# Patient Record
Sex: FEMALE | Race: WHITE | NOT HISPANIC OR LATINO | Employment: FULL TIME | URBAN - METROPOLITAN AREA
[De-identification: names, ages, dates, MRNs, and addresses within clinical notes are randomized per-mention and may not be internally consistent; named-entity substitution may affect disease eponyms.]

---

## 2017-07-02 ENCOUNTER — OFFICE VISIT (OUTPATIENT)
Dept: URGENT CARE | Facility: URGENT CARE | Age: 47
End: 2017-07-02
Payer: COMMERCIAL

## 2017-07-02 ENCOUNTER — RADIANT APPOINTMENT (OUTPATIENT)
Dept: GENERAL RADIOLOGY | Facility: CLINIC | Age: 47
End: 2017-07-02
Attending: PHYSICIAN ASSISTANT
Payer: COMMERCIAL

## 2017-07-02 VITALS
WEIGHT: 165 LBS | RESPIRATION RATE: 14 BRPM | SYSTOLIC BLOOD PRESSURE: 130 MMHG | TEMPERATURE: 97 F | OXYGEN SATURATION: 98 % | HEIGHT: 65 IN | DIASTOLIC BLOOD PRESSURE: 76 MMHG | BODY MASS INDEX: 27.49 KG/M2 | HEART RATE: 89 BPM

## 2017-07-02 DIAGNOSIS — M79.671 RIGHT FOOT PAIN: Primary | ICD-10-CM

## 2017-07-02 DIAGNOSIS — M79.671 RIGHT FOOT PAIN: ICD-10-CM

## 2017-07-02 DIAGNOSIS — S93.601A FOOT SPRAIN, RIGHT, INITIAL ENCOUNTER: ICD-10-CM

## 2017-07-02 PROCEDURE — 99203 OFFICE O/P NEW LOW 30 MIN: CPT | Performed by: PHYSICIAN ASSISTANT

## 2017-07-02 PROCEDURE — 73630 X-RAY EXAM OF FOOT: CPT | Mod: RT

## 2017-07-02 NOTE — MR AVS SNAPSHOT
After Visit Summary   7/2/2017    Charlotte Garay    MRN: [id number]           Patient Information     Date Of Birth          1970        Visit Information        Provider Department      7/2/2017 5:10 PM Fatimah Harden PA-C [facility]        Today's Diagnoses     Right foot pain    -  1      Care Instructions      Foot Sprain    A sprain is a stretching or tearing of the ligaments that hold a joint together. There are no broken bones. Sprains generally take from 3-6 weeks to heal. A sprain may be treated with a splint, walking cast, or special boot. Mild sprains may not need any additional support.  Home care  The following guidelines will help you care for your injury at home:    Keep your leg elevated when sitting or lying down. This is very important during the first 48 hours to reduce swelling. Stay off the injured foot as much as possible until you can walk on it without pain. If needed, you may use crutches during the first week for this purpose. Crutches can be rented at many pharmacies or surgical/orthopedic supply stores.    You may be given a cast shoe to wear to prevent movement in your foot. If not, you can use a sandal or any shoe that does not put pressure on the injured area until the swelling and pain go away. If using a sandal, be careful not to hit your foot against anything, since another injury could make the sprain worse.    Apply an ice pack over the injured area for 15 to 20 minutes every 3 to 6 hours. You should do this for the first 24 to 48 hours. You can make an ice pack by filling a plastic bag that seals at the top with ice cubes and then wrapping it with a thin towel. Continue to use ice packs for relief of pain and swelling as needed. As the ice melts, avoid getting any wrap, splint, or cast wet. After 48 hours, apply heat from a warm shower or bath for 20 minutes several times daily. Alternating ice and heat may also be  helpful.    You may use over-the-counter pain medicine to control pain, unless another medicine was prescribed. If you have chronic liver or kidney disease or ever had a stomach ulcer or GI bleeding, talk with your healthcare provider before using these medicines.    If you were given a splint or cast, keep it dry. Bathe with your splint or cast well out of the water, protected with 2 large plastic bags, rubber-banded at the top end. If a fiberglass splint or cast gets wet, you can dry it with a hair dryer.    You may return to sports after healing, when you can run without pain.  Follow-up care  Follow up with your healthcare provider as directed. Sometimes fractures don t show up on the first X-ray. Bruises and sprains can sometimes hurt as much as a fracture. These injuries can take time to heal completely. If your symptoms don t improve or they get worse, talk with your healthcare provider. You may need a repeat X-ray.  When to seek medical advice  Call your healthcare provider right away if any of these occur:    The plaster cast or splint gets wet or soft    The fiberglass cast or splint gets wet and does not dry for 24 hours    Pain or swelling increases, or redness appears    A bad odor comes from within the cast    Fever of 100.4 F (38 C) or above lasting for 24 to 48 hours    Toes on the injured foot become cold, blue, numb, or tingly  Date Last Reviewed: 11/20/2015 2000-2017 [employer] [address] [place] [address]. All rights reserved. This information is not intended as a substitute for professional medical care. Always follow your healthcare professional's instructions.                Follow-ups after your visit        Who to contact     If you have questions or need follow up information about today's clinic visit or your schedule please contact [facility] directly at [phone].  Normal or non-critical lab and imaging results will be  "communicated to you by [employer], letter or phone within 4 business days after the clinic has received the results. If you do not hear from us within 7 days, please contact the clinic through [email or web address] or phone. If you have a critical or abnormal lab result, we will notify you by phone as soon as possible.  Submit refill requests through [email or web address] or call your pharmacy and they will forward the refill request to us. Please allow 3 business days for your refill to be completed.          Additional Information About Your Visit        [email or web address] Information     [email or web address] lets you send messages to your doctor, view your test results, renew your prescriptions, schedule appointments and more. To sign up, go to [email or web address] . Click on \"Log in\" on the left side of the screen, which will take you to the Welcome page. Then click on \"Sign up Now\" on the right side of the page.     You will be asked to enter the access code listed below, as well as some personal information. Please follow the directions to create your username and password.     Your access code is: [email or web address]  Expires: 2017  5:58 PM     Your access code will  in 90 days. If you need help or a new code, please call your [facility] clinic or [phone].        Care EveryWhere ID     This is your Care EveryWhere ID. This could be used by other organizations to access your [facility] medical records  [id number]        Your Vitals Were     Pulse Temperature Respirations Height Last Period Pulse Oximetry    89 97  F (36.1  C) (Oral) 14 5' 5\" (1.651 m) (LMP Unknown) 98%    Breastfeeding? BMI (Body Mass Index)                No 27.46 kg/m2           Blood Pressure from Last 3 Encounters:   17 130/76   14 128/76    Weight from Last 3 Encounters:   17 165 lb (74.8 kg)   14 162 lb (73.5 kg)               Primary Care Provider Office Phone # Fax #    Omar Becerra [phone]       [facility] " [address]  [facility] [address]        Equal Access to Services     HARSHADHANNA CARLOS : Hadii raman ku javi Somitchell, warooseveltda luqadaha, qaalisata kasherri madysonmore, mallory cortezin hayaalizette coughlinkatie villagomezlilli matthew. So [facility] [phone].    ATENCIÓN: Si habla español, tiene a hwang disposición servicios gratuitos de asistencia lingüística. Llame al [phone].    We comply with applicable federal civil rights laws and [place] laws. We do not discriminate on the basis of race, color, national origin, age, disability sex, sexual orientation or gender identity.            Thank you!     Thank you for choosing [facility]  for your care. Our goal is always to provide you with excellent care. Hearing back from our patients is one way we can continue to improve our services. Please take a few minutes to complete the written survey that you may receive in the mail after your visit with us. Thank you!             Your Updated Medication List - Protect others around you: Learn how to safely use, store and throw away your medicines at www.disposemymeds.org.          This list is accurate as of: 7/2/17  5:58 PM.  Always use your most recent med list.                   Brand Name Dispense Instructions for use Diagnosis    ASPIRIN EC PO      Take 81 mg by mouth        ibuprofen 600 MG tablet    ADVIL/MOTRIN    40 tablet    Take 1 tablet (600 mg) by mouth every 6 hours as needed for moderate pain    Post-op pain       LIPITOR PO      Take 20 mg by mouth        oxyCODONE-acetaminophen 5-325 MG per tablet    PERCOCET    40 tablet    Take 1-2 tablets by mouth every 4 hours as needed for moderate to severe pain    Post-op pain       PLAVIX PO           VITAMIN D (CHOLECALCIFEROL) PO      Take 2,000 Units by mouth        ZOLOFT PO      Take by mouth daily

## 2017-07-02 NOTE — NURSING NOTE
"Chief Complaint   Patient presents with     Urgent Care     Musculoskeletal Problem     went dancing last night at her reunion. injury to right foot.       Initial /76  Pulse 89  Temp 97  F (36.1  C) (Oral)  Resp 14  Ht 5' 5\" (1.651 m)  Wt 165 lb (74.8 kg)  LMP  (LMP Unknown)  SpO2 98%  Breastfeeding? No  BMI 27.46 kg/m2 Estimated body mass index is 27.46 kg/(m^2) as calculated from the following:    Height as of this encounter: 5' 5\" (1.651 m).    Weight as of this encounter: 165 lb (74.8 kg).  Medication Reconciliation: complete  "

## 2017-07-02 NOTE — PROGRESS NOTES
"SUBJECTIVE:  Chief Complaint   Patient presents with     Urgent Care     Musculoskeletal Problem     went dancing last night at her reunion. injury to right foot.     Charlotte Garay is a 46 year old female presents with a chief complaint of right foot pain and swelling.  The injury occurred 1 day(s) ago.   The injury happened while at a party for her  reunion. How: Patient was dancing and she thinks she must have stepped wrong. She felt immediate pain and immediate swelling after the incident. The patient complained of moderate pain  and has not had decreased ROM.  Pain exacerbated by inversion of foot and weight bearing. She does not have increased pain with palpation.  Relieved by rest.  She treated it initially with elevation. This is the first time this type of injury has occurred to this patient. She denies having numbness or tingling into her toes.     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     Fibroids      Current Outpatient Prescriptions   Medication Sig Dispense Refill     Clopidogrel Bisulfate (PLAVIX PO)        Sertraline HCl (ZOLOFT PO) Take by mouth daily       oxyCODONE-acetaminophen (PERCOCET) 5-325 MG per tablet Take 1-2 tablets by mouth every 4 hours as needed for moderate to severe pain 40 tablet 0     ibuprofen (ADVIL,MOTRIN) 600 MG tablet Take 1 tablet (600 mg) by mouth every 6 hours as needed for moderate pain 40 tablet 0     VITAMIN D, CHOLECALCIFEROL, PO Take 2,000 Units by mouth       ASPIRIN EC PO Take 81 mg by mouth       Atorvastatin Calcium (LIPITOR PO) Take 20 mg by mouth        Social History   Substance Use Topics     Smoking status: Former Smoker     Smokeless tobacco: Not on file      Comment: quit 2009     Alcohol use Yes      Comment: every day       ROS:  Review of systems negative except as stated above.    EXAM:   /76  Pulse 89  Temp 97  F (36.1  C) (Oral)  Resp 14  Ht 5' 5\" (1.651 m)  Wt 165 lb (74.8 " kg)  LMP  (LMP Unknown)  SpO2 98%  Breastfeeding? No  BMI 27.46 kg/m2  Gen: healthy,alert,no distress  Extremity: Right foot has mild TTP in right foot over 5th metatarsal with erythema and swelling. FROM at ankle and toes. Pain elicited with inversion.  There is not compromise to the distal circulation.  Pulses are +2 and CRT is brisk  GENERAL APPEARANCE: healthy, alert and no distress  EXTREMITIES: peripheral pulses normal  SKIN: no suspicious lesions or rashes  NEURO: Normal strength and tone, sensory exam grossly normal, mentation intact and speech normal    X-RAY was done -- Unremarkable    ASSESSMENT/PLAN:  1. Right foot pain  RICE treatment.  IBU for pain  - XR Foot Right G/E 3 Views; Future    2. Foot sprain, right, initial encounter    Fatimah Harden PA-C

## 2017-07-02 NOTE — PATIENT INSTRUCTIONS
Foot Sprain    A sprain is a stretching or tearing of the ligaments that hold a joint together. There are no broken bones. Sprains generally take from 3-6 weeks to heal. A sprain may be treated with a splint, walking cast, or special boot. Mild sprains may not need any additional support.  Home care  The following guidelines will help you care for your injury at home:    Keep your leg elevated when sitting or lying down. This is very important during the first 48 hours to reduce swelling. Stay off the injured foot as much as possible until you can walk on it without pain. If needed, you may use crutches during the first week for this purpose. Crutches can be rented at many pharmacies or surgical/orthopedic supply stores.    You may be given a cast shoe to wear to prevent movement in your foot. If not, you can use a sandal or any shoe that does not put pressure on the injured area until the swelling and pain go away. If using a sandal, be careful not to hit your foot against anything, since another injury could make the sprain worse.    Apply an ice pack over the injured area for 15 to 20 minutes every 3 to 6 hours. You should do this for the first 24 to 48 hours. You can make an ice pack by filling a plastic bag that seals at the top with ice cubes and then wrapping it with a thin towel. Continue to use ice packs for relief of pain and swelling as needed. As the ice melts, avoid getting any wrap, splint, or cast wet. After 48 hours, apply heat from a warm shower or bath for 20 minutes several times daily. Alternating ice and heat may also be helpful.    You may use over-the-counter pain medicine to control pain, unless another medicine was prescribed. If you have chronic liver or kidney disease or ever had a stomach ulcer or GI bleeding, talk with your healthcare provider before using these medicines.    If you were given a splint or cast, keep it dry. Bathe with your splint or cast well out of the water,  protected with 2 large plastic bags, rubber-banded at the top end. If a fiberglass splint or cast gets wet, you can dry it with a hair dryer.    You may return to sports after healing, when you can run without pain.  Follow-up care  Follow up with your healthcare provider as directed. Sometimes fractures don t show up on the first X-ray. Bruises and sprains can sometimes hurt as much as a fracture. These injuries can take time to heal completely. If your symptoms don t improve or they get worse, talk with your healthcare provider. You may need a repeat X-ray.  When to seek medical advice  Call your healthcare provider right away if any of these occur:    The plaster cast or splint gets wet or soft    The fiberglass cast or splint gets wet and does not dry for 24 hours    Pain or swelling increases, or redness appears    A bad odor comes from within the cast    Fever of 100.4 F (38 C) or above lasting for 24 to 48 hours    Toes on the injured foot become cold, blue, numb, or tingly  Date Last Reviewed: 11/20/2015 2000-2017 [employer]. [address] [place] [address]. All rights reserved. This information is not intended as a substitute for professional medical care. Always follow your healthcare professional's instructions.

## 2019-10-29 ENCOUNTER — HOSPITAL ENCOUNTER (OUTPATIENT)
Facility: CLINIC | Age: 49
Discharge: HOME | End: 2019-10-29
Attending: COLON & RECTAL SURGERY | Admitting: COLON & RECTAL SURGERY
Payer: COMMERCIAL

## 2019-10-29 VITALS
OXYGEN SATURATION: 95 % | RESPIRATION RATE: 13 BRPM | DIASTOLIC BLOOD PRESSURE: 83 MMHG | BODY MASS INDEX: 29.16 KG/M2 | HEIGHT: 65 IN | WEIGHT: 175 LBS | SYSTOLIC BLOOD PRESSURE: 99 MMHG | HEART RATE: 75 BPM

## 2019-10-29 LAB — COLONOSCOPY: NORMAL

## 2019-10-29 PROCEDURE — G0500 MOD SEDAT ENDO SERVICE >5YRS: HCPCS | Performed by: COLON & RECTAL SURGERY

## 2019-10-29 PROCEDURE — G0121 COLON CA SCRN NOT HI RSK IND: HCPCS | Performed by: COLON & RECTAL SURGERY

## 2019-10-29 PROCEDURE — 45378 DIAGNOSTIC COLONOSCOPY: CPT | Performed by: COLON & RECTAL SURGERY

## 2019-10-29 PROCEDURE — 25000128 H RX IP 250 OP 636: Performed by: COLON & RECTAL SURGERY

## 2019-10-29 RX ORDER — DIPHENHYDRAMINE HCL 25 MG
25 CAPSULE ORAL EVERY 4 HOURS PRN
Status: DISCONTINUED | OUTPATIENT
Start: 2019-10-29 | End: 2019-10-29 | Stop reason: HOSPADM

## 2019-10-29 RX ORDER — FLUMAZENIL 0.1 MG/ML
0.2 INJECTION, SOLUTION INTRAVENOUS
Status: DISCONTINUED | OUTPATIENT
Start: 2019-10-29 | End: 2019-10-29 | Stop reason: HOSPADM

## 2019-10-29 RX ORDER — ONDANSETRON 4 MG/1
4 TABLET, ORALLY DISINTEGRATING ORAL EVERY 6 HOURS PRN
Status: DISCONTINUED | OUTPATIENT
Start: 2019-10-29 | End: 2019-10-29 | Stop reason: HOSPADM

## 2019-10-29 RX ORDER — LIDOCAINE 40 MG/G
CREAM TOPICAL
Status: DISCONTINUED | OUTPATIENT
Start: 2019-10-29 | End: 2019-10-29 | Stop reason: HOSPADM

## 2019-10-29 RX ORDER — DIPHENHYDRAMINE HYDROCHLORIDE 50 MG/ML
25 INJECTION INTRAMUSCULAR; INTRAVENOUS EVERY 4 HOURS PRN
Status: DISCONTINUED | OUTPATIENT
Start: 2019-10-29 | End: 2019-10-29 | Stop reason: HOSPADM

## 2019-10-29 RX ORDER — PROCHLORPERAZINE MALEATE 10 MG
10 TABLET ORAL EVERY 6 HOURS PRN
Status: DISCONTINUED | OUTPATIENT
Start: 2019-10-29 | End: 2019-10-29 | Stop reason: HOSPADM

## 2019-10-29 RX ORDER — FENTANYL CITRATE 50 UG/ML
INJECTION, SOLUTION INTRAMUSCULAR; INTRAVENOUS PRN
Status: DISCONTINUED | OUTPATIENT
Start: 2019-10-29 | End: 2019-10-29 | Stop reason: HOSPADM

## 2019-10-29 RX ORDER — ONDANSETRON 2 MG/ML
4 INJECTION INTRAMUSCULAR; INTRAVENOUS EVERY 6 HOURS PRN
Status: DISCONTINUED | OUTPATIENT
Start: 2019-10-29 | End: 2019-10-29 | Stop reason: HOSPADM

## 2019-10-29 RX ORDER — NALOXONE HYDROCHLORIDE 0.4 MG/ML
.1-.4 INJECTION, SOLUTION INTRAMUSCULAR; INTRAVENOUS; SUBCUTANEOUS
Status: DISCONTINUED | OUTPATIENT
Start: 2019-10-29 | End: 2019-10-29 | Stop reason: HOSPADM

## 2019-10-29 RX ORDER — ONDANSETRON 2 MG/ML
4 INJECTION INTRAMUSCULAR; INTRAVENOUS
Status: DISCONTINUED | OUTPATIENT
Start: 2019-10-29 | End: 2019-10-29 | Stop reason: HOSPADM

## 2019-10-29 ASSESSMENT — MIFFLIN-ST. JEOR: SCORE: 1419.67

## 2019-10-29 NOTE — OP NOTE
See Provation Note In Chart    Fatimah Singleton MD  [facility]  Office Phone # [phone]

## 2019-10-29 NOTE — H&P
Pre-Endoscopy History and Physical     Charlotte Wang MRN# [id number]   YOB: 1970 Age: 48 year old     Date of Procedure: 10/29/19  Primary care provider: Omar Becerra  Type of Endoscopy: Colonoscopy  Reason for Procedure: screening  Type of Anesthesia Anticipated: Moderate Sedation    HPI:    Charlotte is a 48 year old female who will be undergoing the above procedure.      A history and physical has been performed. The patient's medications and allergies have been reviewed. The risks and benefits of the procedure and the sedation options and risks were discussed with the patient.  All questions were answered and informed consent was obtained.      She denies a personal or family history of anesthesia complications or bleeding disorders.     No Known Allergies     No current facility-administered medications on file prior to encounter.   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    REVIEW OF SYSTEMS:     5 point ROS negative except as noted above in HPI, including  "Gen., Resp., CV, GI &  system review.      PHYSICAL EXAM:   LMP  (LMP Unknown)  Estimated body mass index is 27.46 kg/m  as calculated from the following:    Height as of 7/2/17: 1.651 m (5' 5\").    Weight as of 7/2/17: 74.8 kg (165 lb).   GENERAL APPEARANCE: healthy and alert  MENTAL STATUS: alert  AIRWAY EXAM: Mallampatti Class II (visualization of the soft palate, fauces, and uvula)  RESP: lungs clear to auscultation - no rales, rhonchi or wheezes  CV: regular rates and rhythm      IMPRESSION   ASA Class 2 - Mild systemic disease        PLAN:     Plan for colonoscopy. We discussed the risks, benefits and alternatives and the patient wished to proceed.    The above has been forwarded to the consulting provider.      Fatimah Singleton MD  [facility]  Phone: [phone]  Fax: [phone]  October 29, 2019    "

## 2022-05-06 ENCOUNTER — PATIENT OUTREACH (OUTPATIENT)
Dept: ONCOLOGY | Facility: CLINIC | Age: 52
End: 2022-05-06

## 2022-05-06 ENCOUNTER — OFFICE VISIT (OUTPATIENT)
Dept: FAMILY MEDICINE | Facility: CLINIC | Age: 52
End: 2022-05-06
Payer: COMMERCIAL

## 2022-05-06 VITALS
RESPIRATION RATE: 15 BRPM | WEIGHT: 177 LBS | OXYGEN SATURATION: 96 % | TEMPERATURE: 98.1 F | SYSTOLIC BLOOD PRESSURE: 134 MMHG | HEART RATE: 70 BPM | DIASTOLIC BLOOD PRESSURE: 86 MMHG | BODY MASS INDEX: 28.45 KG/M2 | HEIGHT: 66 IN

## 2022-05-06 DIAGNOSIS — G51.8 FACIAL NEURALGIA: Primary | ICD-10-CM

## 2022-05-06 DIAGNOSIS — R22.42 MASS OF LEFT THIGH: ICD-10-CM

## 2022-05-06 DIAGNOSIS — M79.605 PAIN OF LEFT LOWER EXTREMITY: ICD-10-CM

## 2022-05-06 DIAGNOSIS — M21.852 HIP DYSPLASIA, ACQUIRED, LEFT: ICD-10-CM

## 2022-05-06 DIAGNOSIS — I77.71 CAROTID ARTERY DISSECTION (H): ICD-10-CM

## 2022-05-06 DIAGNOSIS — Z86.19 HISTORY OF LYME DISEASE: ICD-10-CM

## 2022-05-06 DIAGNOSIS — Z80.3 FAMILY HISTORY OF MALIGNANT NEOPLASM OF BREAST: ICD-10-CM

## 2022-05-06 PROCEDURE — 99204 OFFICE O/P NEW MOD 45 MIN: CPT | Performed by: FAMILY MEDICINE

## 2022-05-06 RX ORDER — PREGABALIN 75 MG/1
CAPSULE ORAL
COMMUNITY
Start: 2022-03-29 | End: 2022-05-06 | Stop reason: DRUGHIGH

## 2022-05-06 RX ORDER — CLOPIDOGREL BISULFATE 75 MG/1
TABLET ORAL
COMMUNITY

## 2022-05-06 RX ORDER — PREGABALIN 25 MG/1
CAPSULE ORAL
Qty: 56 CAPSULE | Refills: 0 | Status: SHIPPED | OUTPATIENT
Start: 2022-05-06 | End: 2022-06-17

## 2022-05-06 RX ORDER — PREGABALIN 25 MG/1
CAPSULE ORAL
COMMUNITY
Start: 2021-12-16 | End: 2022-05-06

## 2022-05-06 ASSESSMENT — ANXIETY QUESTIONNAIRES
4. TROUBLE RELAXING: SEVERAL DAYS
GAD7 TOTAL SCORE: 4
GAD7 TOTAL SCORE: 4
7. FEELING AFRAID AS IF SOMETHING AWFUL MIGHT HAPPEN: NOT AT ALL
3. WORRYING TOO MUCH ABOUT DIFFERENT THINGS: SEVERAL DAYS
1. FEELING NERVOUS, ANXIOUS, OR ON EDGE: SEVERAL DAYS
GAD7 TOTAL SCORE: 4
8. IF YOU CHECKED OFF ANY PROBLEMS, HOW DIFFICULT HAVE THESE MADE IT FOR YOU TO DO YOUR WORK, TAKE CARE OF THINGS AT HOME, OR GET ALONG WITH OTHER PEOPLE?: NOT DIFFICULT AT ALL
5. BEING SO RESTLESS THAT IT IS HARD TO SIT STILL: NOT AT ALL
7. FEELING AFRAID AS IF SOMETHING AWFUL MIGHT HAPPEN: NOT AT ALL
6. BECOMING EASILY ANNOYED OR IRRITABLE: SEVERAL DAYS
2. NOT BEING ABLE TO STOP OR CONTROL WORRYING: NOT AT ALL

## 2022-05-06 NOTE — PATIENT INSTRUCTIONS
MRI 3/32/22  Impression:   1. No intracranial mass, acute infarct or hemorrhage, or hydrocephalus.   2. There is a solitary subcentimeter focus of T2 prolongation in the left frontal lobe subcortical white matter that is nonspecific. This could represent sequela of migraine headaches or prior inflammatory/demyelinating insult.     Normal CTA of head and carotids 3/23/22          MRI spine 6/24/21  IMPRESSION:   1. At L5-S1, a small left paramidline disc protrusion and annular tear without stenosis.   2. At L4-5, bilateral facet arthropathy and small facet joint effusions. No stenosis.      Leg Ultrasound  Please call [facility] Department to schedule an outpatient appointment at [phone] for leg ultrasound.

## 2022-05-06 NOTE — PROGRESS NOTES
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  Comment: New, previously undiagnosed problem with uncertain prognosis and additional work-up planned.   Plan: I do think that the neurologist is the most appropriate specialist to follow up on this. I do wonder if it's a complication of prior carotid artery dissection.  She will schedule follow up appointment with neurologist to follow ID visit.  I think Lyme disease is a red herring for this and does not preclude workup of facial neuralgia.  She has spoken with her vascular surgeon about these symptoms and he does not feel symptoms are related to carotid artery dissection, but, again, location and timing of symptoms seems suspicious.    (I77.71) Carotid artery dissection (H)  Comment: see above, no intervention per patient, normal exam today.  Plan: risk factors controlled (on statin and clopidogrel).    (R22.42) Mass of left thigh  Comment: New, previously undiagnosed problem with uncertain prognosis and additional work-up planned.   Given stability of size and onset one year ago with painful lump, I suspect hematoma/calcified hematoma. However, I do think imaging is warrented to rule out sarcoma or other mass.  Plan: US Lower Extremity Non Vascular Left           (M79.605) Pain of left lower extremity  Comment: suspect related to congenital hip dysplasia, but note that she does have L5-S1 herniated disc as noted below. physical therapy has not been effective. Continue over the counter medications.  Plan: pregabalin (LYRICA) 25 MG capsule        pregabalin hasn't been noticably effective, she will wean dose from 75 mg to 50 mg for 2 weeks, then down to 25 mg as tolerated, goal to stop if it's not actually helping pain.    (M21.852) Hip dysplasia, acquired,  "left  Comment: see above, has had surgery to remove hip calcification.    (Z80.3) Family history of malignant neoplasm of breast  Comment: maternal diagnosis age 28, mother  age 32, in .  Plan: Cancer Risk Mgmt/Cancer Genetic Counseling         Referral            (Z86.19) History of Lyme disease  Comment: see above, certainly consult with ID specialist as already scheduled to discuss what treatment, if any, is warrented at an indeterminate time out from initial infection. I don't think any of patient's current symptoms are attributable to Lyme disease.    Return in about 2 weeks (around 2022) for follow up virtual visit.    Ana Coulter MD  [facility]    Hever Porter is a 51 year old who presents for the following health issues     Neurological symptoms  She's had ongoing \"head zaps\" of left side of head, recurrent, brief, severe 8/10 pain a few seconds with spikes to 10/10, and sometimes comes in clusters of episodes. No triggers noted. No treatment as they're too short. She is on pregabalin for left leg pain., 75 mg at bedtime. Gabapentin was tried initially but made her too drowsy.    Charlotte reports left sided facial drooping in February associated with having trouble closing her eye, without drooling, facial numbness or difficulty chewing that improved over the next 2 months. She was seen in the [facility] ER in March and had normal imaging as below.  She's seen a neurologist who recommended follow up re: Lyme disease.      Distant hx of head injury, hit by softball on left side of head below her left eye , did have a concussion.    MRI 3/32/22  Impression:   1. No intracranial mass, acute infarct or hemorrhage, or hydrocephalus.   2. There is a solitary subcentimeter focus of T2 prolongation in the left frontal lobe subcortical white matter that is nonspecific. This could represent sequela of migraine headaches or prior inflammatory/demyelinating " "insult.    Normal CTA of head and carotids 3/23/22    PMHX significant for history of bilateral carotid  She has an appointment in 2 weeks with ID specialist.  Pt is here to establish care with Dr. Coulter    Left leg aching  She's had left thigh aching for about one year, did have a fall onto her right hip prior to onset of symptoms, but leg pain has persisted despite chiropractic care. Leg pain associated with new lump of lower anterior thigh that started as a painful lump that has persisted, although no longer painful.    She's seen an orthopedic specialist and had lumbar MRI that was normal, she's had a normal EMG as well. She's done physical therapy for this, she's also used an SI belt but that caused more pain.    MRI spine 6/24/21  IMPRESSION:   1. At L5-S1, a small left paramidline disc protrusion and annular tear without stenosis.   2. At L4-5, bilateral facet arthropathy and small facet joint effusions. No stenosis.        Musculoskeletal Problem    History of Present Illness       Mental Health Follow-up:  Patient presents to follow-up on Anxiety.    Patient's anxiety since last visit has been:  Good  The patient is not having other symptoms associated with anxiety.  Any significant life events: No  Patient is not feeling anxious or having panic attacks.  Patient has no concerns about alcohol or drug use.         Today's BONILLA-7 Score: 4    Hyperlipidemia:  She presents for follow up of hyperlipidemia.  She is taking medication to lower cholesterol. She is not having myalgia or other side effects to statin medications.    Migraines:   Since the patient's last clinic visit, headaches are: no change  The patient is getting headaches:  3-4 per week  She is able to do normal daily activities when she has a migraine.  The patient is taking the following rescue/relief medications:  Ibuprofen (Advil, Motrin) and Tylenol   Patient states \"I get only a small amount of relief\" from the rescue/relief medications.   The " "patient is taking the following medications to prevent migraines:  No medications to prevent migraines  In the past 4 weeks, the patient has gone to an Urgent Care or Emergency Room 1 time times due to headaches.    Reason for visit:  Abnormal blood test and neurological issues New general practitioner needed  Symptom onset:  More than a month  Symptoms include:  Left sided pain leg pain, headaches (brain zaps) face paralysis  Symptom intensity:  Severe  Symptom progression:  Worsening  Had these symptoms before:  Yes  Has tried/received treatment for these symptoms:  No    She eats 2-3 servings of fruits and vegetables daily.She consumes 1 sweetened beverage(s) daily.She exercises with enough effort to increase her heart rate 20 to 29 minutes per day.  She exercises with enough effort to increase her heart rate 4 days per week.   She is taking medications regularly.     Review of Systems   Constitutional, HEENT, cardiovascular, pulmonary, GI, , musculoskeletal, neuro, skin, endocrine and psych systems are negative, except as otherwise noted.      Objective    /86 (BP Location: Left arm, Patient Position: Chair, Cuff Size: Adult Regular)   Pulse 70   Temp 98.1  F (36.7  C) (Temporal)   Resp 15   Ht 1.676 m (5' 6\")   Wt 80.3 kg (177 lb)   LMP  (LMP Unknown)   SpO2 96%   BMI 28.57 kg/m    Body mass index is 28.57 kg/m .  Physical Exam   GENERAL: healthy, alert and no distress  EYES: Eyes grossly normal to inspection, PERRL and conjunctivae and sclerae normal  HENT: ear canals and TM's normal, nose and mouth without ulcers or lesions  NECK: no adenopathy, no asymmetry, masses, or scars and thyroid normal to palpation  RESP: lungs clear to auscultation - no rales, rhonchi or wheezes  BREAST: normal without masses, tenderness or nipple discharge and no palpable axillary masses or adenopathy  CV: regular rate and rhythm, normal S1 S2, no S3 or S4, no murmur, click or rub, no peripheral edema and peripheral " pulses strong  MS: no gross musculoskeletal defects noted, no edema  SKIN: no suspicious lesions or rashes  NEURO: Normal strength and tone, sensory exam grossly normal, mentation intact, speech normal, cranial nerves 2-12 intact, DTR's normal and symmetric and gait normal including heel/toe/tandem walking  PSYCH: mentation appears normal, affect normal/bright  LYMPH: no cervical, supraclavicular, axillary, or inguinal adenopathy

## 2022-05-07 ASSESSMENT — ANXIETY QUESTIONNAIRES: GAD7 TOTAL SCORE: 4

## 2022-05-09 ENCOUNTER — HOSPITAL ENCOUNTER (OUTPATIENT)
Dept: ULTRASOUND IMAGING | Facility: CLINIC | Age: 52
Discharge: HOME | End: 2022-05-09
Attending: FAMILY MEDICINE | Admitting: FAMILY MEDICINE
Payer: COMMERCIAL

## 2022-05-09 DIAGNOSIS — R22.42 MASS OF LEFT THIGH: ICD-10-CM

## 2022-05-09 PROCEDURE — 76882 US LMTD JT/FCL EVL NVASC XTR: CPT | Mod: LT

## 2022-05-13 ENCOUNTER — MYC MEDICAL ADVICE (OUTPATIENT)
Dept: FAMILY MEDICINE | Facility: CLINIC | Age: 52
End: 2022-05-13
Payer: COMMERCIAL

## 2022-05-13 DIAGNOSIS — G51.8 FACIAL NEURALGIA: Primary | ICD-10-CM

## 2022-05-13 NOTE — TELEPHONE ENCOUNTER
Patient is asking for a referral to Infectious disease. Had been referred to Dr Schmitt' office and they just pushed her appointment out another month.  I pended a [facility] to [facility] - they are a [facility] provider.  Mariya Kent RN  [facility]

## 2022-05-24 NOTE — TELEPHONE ENCOUNTER
"Dr. Taylor for 5/31/22 visit:    \"Also, in anticipation of our call on May 31, I would like to chat with you about some issues I have been having with concentrating at work. I have always had a touch of ADHD but was able to organize myself well in my positions. However, I started a new job about a year and a half ago, and I am still learning and there are a lot of changes, so I have not been able to use my normal techniques to get myself organized and stay on track. I would like to discuss this further with you during our call. I look forward to chatting with you, and thanks again for the referral. \"    Thank you!  MAGNO Dan, RN  [facility]    "

## 2022-05-29 NOTE — RESULT ENCOUNTER NOTE
Thank you for getting the ultrasound done!  Good news, it looks normal , with no sign of concerning masses.    Sincerely,  Dr. Ana Coulter MD  5/29/2022

## 2022-06-05 ENCOUNTER — HEALTH MAINTENANCE LETTER (OUTPATIENT)
Age: 52
End: 2022-06-05

## 2022-07-31 ENCOUNTER — HEALTH MAINTENANCE LETTER (OUTPATIENT)
Age: 52
End: 2022-07-31

## 2022-10-16 ENCOUNTER — HEALTH MAINTENANCE LETTER (OUTPATIENT)
Age: 52
End: 2022-10-16

## 2024-03-23 ENCOUNTER — HEALTH MAINTENANCE LETTER (OUTPATIENT)
Age: 54
End: 2024-03-23

## 2025-04-12 ENCOUNTER — HEALTH MAINTENANCE LETTER (OUTPATIENT)
Age: 55
End: 2025-04-12

## (undated) RX ORDER — FENTANYL CITRATE 50 UG/ML
INJECTION, SOLUTION INTRAMUSCULAR; INTRAVENOUS
Status: DISPENSED
Start: 2019-10-29